# Patient Record
Sex: MALE | Race: WHITE | NOT HISPANIC OR LATINO | Employment: UNEMPLOYED | URBAN - METROPOLITAN AREA
[De-identification: names, ages, dates, MRNs, and addresses within clinical notes are randomized per-mention and may not be internally consistent; named-entity substitution may affect disease eponyms.]

---

## 2023-11-10 ENCOUNTER — HOSPITAL ENCOUNTER (EMERGENCY)
Facility: HOSPITAL | Age: 58
Discharge: HOME | End: 2023-11-10
Attending: EMERGENCY MEDICINE
Payer: COMMERCIAL

## 2023-11-10 VITALS
TEMPERATURE: 97 F | DIASTOLIC BLOOD PRESSURE: 66 MMHG | OXYGEN SATURATION: 97 % | BODY MASS INDEX: 29.84 KG/M2 | SYSTOLIC BLOOD PRESSURE: 125 MMHG | RESPIRATION RATE: 18 BRPM | HEART RATE: 91 BPM | HEIGHT: 75 IN | WEIGHT: 240 LBS

## 2023-11-10 DIAGNOSIS — Z86.79 HISTORY OF ATRIAL FIBRILLATION: ICD-10-CM

## 2023-11-10 DIAGNOSIS — S40.019A CONTUSION OF SHOULDER, UNSPECIFIED LATERALITY, INITIAL ENCOUNTER: ICD-10-CM
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
## 2023-11-10 DIAGNOSIS — S06.0X1A CONCUSSION WITH LOSS OF CONSCIOUSNESS OF 30 MINUTES OR LESS, INITIAL ENCOUNTER: ICD-10-CM

## 2023-11-10 DIAGNOSIS — Z79.01 ANTICOAGULATED BY ANTICOAGULATION TREATMENT: Primary | ICD-10-CM

## 2023-11-10 PROCEDURE — 12002 RPR S/N/AX/GEN/TRNK2.6-7.5CM: CPT

## 2023-11-10 PROCEDURE — 25000003 PHARM REV CODE 250: Performed by: EMERGENCY MEDICINE

## 2023-11-10 PROCEDURE — 90471 IMMUNIZATION ADMIN: CPT | Performed by: EMERGENCY MEDICINE

## 2023-11-10 PROCEDURE — 63600175 PHARM REV CODE 636 W HCPCS: Performed by: EMERGENCY MEDICINE

## 2023-11-10 PROCEDURE — 99285 EMERGENCY DEPT VISIT HI MDM: CPT | Mod: 25

## 2023-11-10 PROCEDURE — 90715 TDAP VACCINE 7 YRS/> IM: CPT | Performed by: EMERGENCY MEDICINE

## 2023-11-10 RX ORDER — HYDROCODONE BITARTRATE AND ACETAMINOPHEN 5; 325 MG/1; MG/1
1 TABLET ORAL EVERY 4 HOURS PRN
Qty: 15 TABLET | Refills: 0 | Status: SHIPPED | OUTPATIENT
Start: 2023-11-10

## 2023-11-10 RX ORDER — ONDANSETRON 4 MG/1
4 TABLET, ORALLY DISINTEGRATING ORAL EVERY 6 HOURS PRN
Qty: 12 TABLET | Refills: 0 | Status: SHIPPED | OUTPATIENT
Start: 2023-11-10

## 2023-11-10 RX ORDER — HYDROCODONE BITARTRATE AND ACETAMINOPHEN 5; 325 MG/1; MG/1
1 TABLET ORAL
Status: COMPLETED | OUTPATIENT
Start: 2023-11-10 | End: 2023-11-10

## 2023-11-10 RX ORDER — LIDOCAINE HYDROCHLORIDE AND EPINEPHRINE 10; 10 MG/ML; UG/ML
20 INJECTION, SOLUTION INFILTRATION; PERINEURAL ONCE
Status: COMPLETED | OUTPATIENT
Start: 2023-11-10 | End: 2023-11-10

## 2023-11-10 RX ORDER — ACETAMINOPHEN 325 MG/1
650 TABLET ORAL
Status: COMPLETED | OUTPATIENT
Start: 2023-11-10 | End: 2023-11-10

## 2023-11-10 RX ADMIN — ACETAMINOPHEN 650 MG: 325 TABLET, FILM COATED ORAL at 10:11

## 2023-11-10 RX ADMIN — LIDOCAINE HYDROCHLORIDE,EPINEPHRINE BITARTRATE 20 ML: 10; .01 INJECTION, SOLUTION INFILTRATION; PERINEURAL at 10:11

## 2023-11-10 RX ADMIN — TETANUS TOXOID, REDUCED DIPHTHERIA TOXOID AND ACELLULAR PERTUSSIS VACCINE, ADSORBED 0.5 ML: 5; 2.5; 8; 8; 2.5 SUSPENSION INTRAMUSCULAR at 09:11

## 2023-11-10 RX ADMIN — HYDROCODONE BITARTRATE AND ACETAMINOPHEN 1 TABLET: 5; 325 TABLET ORAL at 12:11

## 2023-11-10 NOTE — DISCHARGE INSTRUCTIONS
Staples need to be removed in a proximally 10 days    Please follow the concussion postconcussion and head injury discharge instructions    Return for any emergency problem    Do not under estimate house sore you will be over the next 24-72 hours    No aspirin no Naprosyn no ibuprofen if you take the Norco it has 300 mg of Tylenol in it you may have 1 extra Tylenol along with the Norco if needed  Do not take your dose of Xarelto tonight for you may resume it Saturday night    Ice pack on that occipital area will help with swelling and discomfort  Same for that knot on your left shoulder blade

## 2023-11-10 NOTE — ED PROVIDER NOTES
Encounter Date: 11/10/2023       History     Chief Complaint   Patient presents with    Fall    Head Injury     C/o falling 4 feet off ladder, hitting back of head and had a 3-4 minutes LOC. States he is on a blood thinner. Laceration to back of head, having bilateral shoulder pain     58-year-old male who takes Xarelto for his atrial fibrillation.  Was on a ladder a proximally 3-1/2-4 feet off the ground.  When he fell backwards.  Striking his head the family member that is with him says that he was unconscious for 3-5 minutes.  Family member with him said he is back to normal now he is had no nausea no vomiting has a mild headache he denies neck pain.  He has bilateral shoulder pain he is had surgery on both shoulders in the past.  Denies dysfunction of the shoulders.  He does say his fingers slightly tingly on both sides.  He denies any discomfort when he flexes and extends neck he  denies any discomfort when he rotates head side to side.  He arrived by private vehicle movement of neck does not affect the tingling does not cause any pain  Denies injury anywhere else.  Family member says he is back to normal now.  He has a laceration that bled in his occiput area midline scalp.  Unsure about his last tetanus.  He is had COVID vaccines no pneumonia no flu shots.  He does not use tobacco alcohol or drugs  He is employed  man lives home with his wife.  Dr. Martinez is his primary healthcare provider.  Dr. Miah Macias is his cardiologists  He denies having any drug allergies.  Says his mother is  from cancer.  Said his father has had heart surgery.  But is alive  He takes medications for atrial fibrillation hypertension diabetes and high cholesterol  He is had bilateral shoulder surgery and ORIF of 1 forearm.      Review of patient's allergies indicates:  No Known Allergies  History reviewed. No pertinent past medical history.  History reviewed. No pertinent surgical history.  History reviewed. No pertinent  family history.  Social History     Tobacco Use    Smoking status: Never    Smokeless tobacco: Never     Review of Systems   Constitutional: Negative.    HENT: Negative.     Eyes: Negative.    Respiratory: Negative.     Cardiovascular: Negative.    Gastrointestinal: Negative.  Negative for nausea and vomiting.   Endocrine: Negative.    Genitourinary: Negative.    Musculoskeletal:  Positive for arthralgias (Bilateral shoulder pain mild). Negative for back pain and neck pain.   Skin:  Positive for wound (Laceration occipital scalp).   Allergic/Immunologic: Negative.    Neurological:  Positive for numbness (Not so much numbness but slight tingling to fingers both hands) and headaches. Negative for dizziness and speech difficulty.   Hematological: Negative.    Psychiatric/Behavioral: Negative.     All other systems reviewed and are negative.      Physical Exam     Initial Vitals [11/10/23 0905]   BP Pulse Resp Temp SpO2   (!) 154/79 86 17 96.9 °F (36.1 °C) 95 %      MAP       --         Physical Exam    Nursing note and vitals reviewed.  Constitutional: He appears well-developed and well-nourished. No distress.   Mildly obese white male fully awake oriented GCS is 15 does not appear to be in any gross distress.  Awake oriented able to stand up and transfer from wheelchair to bed with no issue.  GCS is 15 wide awake talking clear headed current denies any severe headache denies nausea vomiting   HENT:   Head: Normocephalic.   Right Ear: Tympanic membrane and external ear normal.   Left Ear: Tympanic membrane and external ear normal.   Nose: Nose normal.   Mouth/Throat: Oropharynx is clear and moist and mucous membranes are normal. Oral lesions: moist muc memb.   There is a crescent-shaped laceration in the mid occiput area.  Without any gross active bleeding at this time.  It is not gaped open.  No evidence of hemotympanum on exam  Patient denies any malocclusion chipped or cracked teeth.   Eyes: Conjunctivae and EOM are  normal. Pupils are equal, round, and reactive to light.   Eyes track normally he denies diplopia visual changes   Neck: Neck supple. No thyromegaly present. No tracheal deviation present. No JVD present.   Able to fully flex and extend neck with no point tenderness   Rotation side to side does not cause any pain does not change the minimum tingling he complains of intermittently in both hands   Normal range of motion.  Cardiovascular:  Normal rate, regular rhythm, normal heart sounds and intact distal pulses.     Exam reveals no gallop and no friction rub.       No murmur heard.  Pulmonary/Chest: Breath sounds normal. No stridor. No respiratory distress. He has no wheezes. He has no rhonchi. He has no rales. He exhibits no tenderness (No chest wall tenderness when you compress chest anterior posterior lateral to lateral.  Does not have any pleuritic pain or splinting when he takes a deep breath  when he takes a big breath).   Abdominal: Abdomen is soft. Bowel sounds are normal. He exhibits no distension and no mass. No signs of injury. There is no abdominal tenderness.   Slightly obese abdomen good bowel sounds all quadrants no masses no rebound   Genitourinary:    Genitourinary Comments: Nontender thoracic lumbar sacral spine no CVA tenderness     Musculoskeletal:         General: Tenderness present. No edema. Normal range of motion.      Cervical back: Normal range of motion and neck supple.      Comments: He is tender when you touch both shoulders is no contour abnormality.  The clavicles are not tender.  hurts when you touch both shoulders.  Axillary nerves intact bilaterally elbows hands benign bilaterally     Lymphadenopathy:     He has no cervical adenopathy.   Neurological: He is alert and oriented to person, place, and time. He has normal strength and normal reflexes. No cranial nerve deficit or sensory deficit. GCS score is 15. GCS eye subscore is 4. GCS verbal subscore is 5. GCS motor subscore is 6.    Bilateral radial bilateral patellar reflexes 2+ brisk  strength 5/5 bilaterally leg strength 5/5 bilaterally normal sensation upper and lower extremities.  Neurological seems completely appropriate at this time.  GCS is 15   Skin: Skin is warm and dry. Capillary refill takes less than 2 seconds. No rash noted.   Psychiatric: He has a normal mood and affect. His behavior is normal. Judgment and thought content normal.         ED Course   Lac Repair    Date/Time: 11/10/2023 10:53 AM    Performed by: Bandar Bundy MD  Authorized by: Bandar Bundy MD    Consent:     Consent obtained:  Verbal    Consent given by:  Patient    Risks, benefits, and alternatives were discussed: yes      Risks discussed:  Infection  Universal protocol:     Procedure explained and questions answered to patient or proxy's satisfaction: yes      Patient identity confirmed:  Verbally with patient  Anesthesia:     Anesthesia method:  Local infiltration    Local anesthetic:  Lidocaine 1% WITH epi  Laceration details:     Location:  Scalp    Scalp location:  Occipital    Length (cm):  2.9    Depth (mm):  5  Pre-procedure details:     Preparation:  Patient was prepped and draped in usual sterile fashion and imaging obtained to evaluate for foreign bodies  Exploration:     Limited defect created (wound extended): no      Hemostasis achieved with:  Epinephrine    Imaging outcome: foreign body not noted      Wound extent: no areolar tissue violation noted, no fascia violation noted, no foreign bodies/material noted, no muscle damage noted, no nerve damage noted, no tendon damage noted, no underlying fracture noted and no vascular damage noted      Contaminated: no    Treatment:     Area cleansed with:  Povidone-iodine    Amount of cleaning:  Standard    Irrigation solution:  Sterile saline    Irrigation volume:  20    Irrigation method:  Syringe    Visualized foreign bodies/material removed: no      Debridement:  None    Undermining:   None    Scar revision: no    Skin repair:     Repair method:  Staples    Number of staples:  6  Approximation:     Approximation:  Close  Repair type:     Repair type:  Simple  Post-procedure details:     Dressing:  Antibiotic ointment    Procedure completion:  Tolerated well, no immediate complications  Comments:      After injection of local anesthetic.  The 2 small laceration on the right side were stapled 1 staple a piece the longer 1 on the left side was closed with 4 staples total of 6 staples used total length of laceration 2.9 cm    Labs Reviewed - No data to display       Imaging Results              X-Ray Shoulder Trauma Right (Final result)  Result time 11/10/23 10:13:08      Final result by Vidal Ramsay MD (11/10/23 10:13:08)                   Impression:      No acute osseous process identified.      Electronically signed by: Vidal Ramsay  Date:    11/10/2023  Time:    10:13               Narrative:    EXAMINATION:  XR SHOULDER TRAUMA 3 VIEW RIGHT    CLINICAL HISTORY:  Unspecified fall, initial encounter    TECHNIQUE:  Two or three views of the right shoulder.    COMPARISON:  None    FINDINGS:  No acute fracture identified.  Glenohumeral and AC joints are aligned with mild to moderate underlying degenerative changes.                                       X-Ray Shoulder Trauma Left (Final result)  Result time 11/10/23 10:11:32      Final result by Vidal Ramsay MD (11/10/23 10:11:32)                   Impression:      No acute osseous process identified.      Electronically signed by: Vidal Ramsay  Date:    11/10/2023  Time:    10:11               Narrative:    EXAMINATION:  XR SHOULDER TRAUMA 3 VIEW LEFT    CLINICAL HISTORY:  Unspecified fall, initial encounter    TECHNIQUE:  Two or three views of the left shoulder.    COMPARISON:  None    FINDINGS:  No acute fracture identified.  Glenohumeral and AC joints are aligned with mild to moderate underlying degenerative changes.                                        CT Head Without Contrast (Final result)  Result time 11/10/23 10:02:35      Final result by Vidal Ramsay MD (11/10/23 10:02:35)                   Impression:      No acute intracranial process identified.      Electronically signed by: Vidal Ramsay  Date:    11/10/2023  Time:    10:02               Narrative:    EXAMINATION:  CT HEAD WITHOUT CONTRAST    CLINICAL HISTORY:  Trauma;    TECHNIQUE:  CT images of the head without IV contrast. Axial, coronal and sagittal images reviewed. Dose length product 1310 mGycm. Automatic exposure control, adjustment of mA/kV or iterative reconstruction technique used to limit radiation dose.    COMPARISON:  CT 06/01/2021    FINDINGS:  Extra-axial spaces/ventricular system: Normal for age.    Intracranial hemorrhage: None identified.    Cerebral parenchyma: No acute large vessel territory infarct or mass effect identified.    Vascular system: No hyperdense vessel appreciated.    Cerebellum: Normal.    Sella: Normal.    Included paranasal sinuses and mastoid air cells: Well-aerated.    Visualized orbits: Normal.    Scalp/Calvarium: Small scalp contusion and trace subcutaneous gas posteriorly.  No depressed skull fracture.                                       CT Cervical Spine Without Contrast (Final result)  Result time 11/10/23 10:10:39      Final result by Vidal Ramsay MD (11/10/23 10:10:39)                   Impression:      No acute cervical spine fracture or traumatic malalignment identified.      Electronically signed by: Vidal Ramsay  Date:    11/10/2023  Time:    10:10               Narrative:    EXAMINATION:  CT CERVICAL SPINE WITHOUT CONTRAST    CLINICAL HISTORY:  Trauma fall head laceration on Eliquis;    TECHNIQUE:  Noncontrast CT images of the cervical spine. Axial, coronal, and sagittal reformatted images reviewed. Dose length product is 1310 mGycm. Automatic exposure control, adjustment of mA/kV or iterative reconstruction technique  used to limit radiation dose.    COMPARISON:  No relevant comparison studies available at the time of dictation.    FINDINGS:  Fractures: No acute fracture identified.    Alignment: Straightening of the cervical lordosis.  No subluxation.    Prevertebral soft tissues: Within normal limits.    Degenerative changes: Multilevel osteophytosis.  Mild disc space narrowing at C3-4 and C6-7.    Incidental findings: None identified.                                       Medications   HYDROcodone-acetaminophen 5-325 mg per tablet 1 tablet (has no administration in time range)   LIDOcaine-EPINEPHrine 1%-1:100,000 injection 20 mL (20 mLs Intradermal Given 11/10/23 1056)   Tdap (BOOSTRIX) vaccine injection 0.5 mL (0.5 mLs Intramuscular Given 11/10/23 0963)   acetaminophen tablet 650 mg (650 mg Oral Given 11/10/23 1026)     Medical Decision Making  About 30 minutes prior to arrival had the fall    58-year-old male who takes Xarelto for his atrial fibrillation.  Was on a ladder a proximally 3-1/2-4 feet off the ground.  When he fell backwards.  Striking his head the family member that is with him says that he was unconscious for 3-5 minutes.  Family member with him said he is back to normal now he is had no nausea no vomiting has a mild headache he denies neck pain.  He has bilateral shoulder pain he is had surgery on both shoulders in the past.  Denies dysfunction of the shoulders.  He does say his fingers slightly tingly on both sides.  He denies any discomfort when he flexes and extends neck he  denies any discomfort when he rotates head side to side.  He arrived by private vehicle movement of neck does not affect the tingling does not cause any pain  Denies injury anywhere else.  Family member says he is back to normal now.  He has a laceration that bled in his occiput area midline scalp.  Unsure about his last tetanus.  He is had COVID vaccines no pneumonia no flu shots.      Amount and/or Complexity of Data  Reviewed  Independent Historian: spouse     Details: Family member who came in with him as well as spouse provide background information family member who is with him at the time said unconscious 3-5 minutes acting normal now  Radiology: ordered and independent interpretation performed.     Details: Bilateral shoulders head CT neck CT ordered and pending  Discussion of management or test interpretation with external provider(s): Ddx   Scalp laceration, concussion grade 1, anticoagulated by anticoagulation treatment, intracranial hemorrhage, in his cerebral contusion, subarachnoid hemorrhage subdural hemorrhage cervical spine injury, intraparenchymal hemorrhage shoulder injury    Risk  Prescription drug management.  Risk Details: MDM  Problems addressed  Co-morbidities and/or factors adding to the complexity or risk for the patient:  Anticoagulated on Xarelto  Problems addressed:  Fall 3-4 feet landed hit head laceration knocked out anticoagulated  Acute problem/illness or progression/exacerbation of chronic problem with potential threat to life/bodily dysfunction?:  Intracranial bleeding and a man on Xarelto life-threatening  Differential diagnoses/problems considered: see above     Amount and/or Complexity of Data Reviewed  Independent Historian: friend  (see above for summary)  External Data Reviewed: notes from previous ED visits (see above for summary) Bell's palsy workup in 2021  Risk and benefits of testing: discussed   Labs: Labs: ordered and reviewed  Radiology:Radiology: ordered and independent interpretation performed (see above or ED course)  ECG/medicine tests:Radiology: ordered and independent interpretation performed (see above or ED course)  discussed with primary care physician    Risk  Prescription drug management   Diagnosis or treatment significantly impacted by social determinants of health: none   Shared decision making     Critical Care  none     Critical Care  Total time providing critical  care: 0 minutes               ED Course as of 11/10/23 1200   Fri Nov 10, 2023   1022 Radiographs have returned negative brain CT returned negative cervical spine CT negative [DM]   1023 Radiographs of both shoulders are negative.  I explained all this to the family.  I told the family that I do not want him to go home to a least 12/12/2030.  That we would come so up his head I cautioned him about how sore he would be I talked to him about narcotics had the narcotics discussion if the time of discharge at noon he is still awake and lucid I will give him [place] to go home with.  He is getting 650 of acetaminophen now for shoulder pain his regular attending physician hurt he was in the hospital came and visited with him.  He did not find any deficits either [DM]   1118 When we sat the patient up to prepare his occipital laceration.  Large bruise and superficial abrasion was noted overlying the left scapula.  He was tender in this area. [DM]   1158 At noon I visited with the patient wife and other family member in the room.  Neurologically he is GCS 15 is no focal deficits is complaining of right shoulder pain now more than left I told him again how sore he is going to be over the next 72 hours give him 1 [place] here talked to him about taking Tylenol with the Norco 1 tablet okay talk to him about common sense problems head discharge instructions etc.. [DM]      ED Course User Index  [DM] Bandar Bundy MD                    Clinical Impression:   Final diagnoses:  [W19.XXXA] Fall  [Z79.01] Anticoagulated by anticoagulation treatment (Primary)  [S40.019A] Contusion of shoulder, unspecified laterality, initial encounter - Bilateral shoulder contusion  [Z86.79] History of atrial fibrillation  [S06.0X1A] Concussion with loss of consciousness of 30 minutes or less, initial encounter - 3-5 minutes per family who was present at the event        ED Disposition Condition    Discharge Stable          ED Prescriptions        Medication Sig Dispense Start Date End Date Auth. Provider    HYDROcodone-acetaminophen (NORCO) 5-325 mg per tablet Take 1 tablet by mouth every 4 (four) hours as needed for Pain. 15 tablet 11/10/2023 -- Bandar Bundy MD    ondansetron (ZOFRAN-ODT) 4 MG TbDL Take 1 tablet (4 mg total) by mouth every 6 (six) hours as needed. 12 tablet 11/10/2023 -- Bandar Bundy MD          Follow-up Information       Follow up With Specialties Details Why Contact Info    Manohar Martinez MD Internal Medicine In 5 days As needed [address] [facility] [address]  [facility], [facility] [address]  [phone]               Bandar Bundy MD  11/10/23 3852

## 2023-11-10 NOTE — Clinical Note
"James"Maryjane Christianson was seen and treated in our emergency department on 11/10/2023.  He may return to work on 11/13/2023.       If you have any questions or concerns, please don't hesitate to call.      Bandar Bundy MD"

## 2023-11-22 DIAGNOSIS — M25.511 RIGHT SHOULDER PAIN: ICD-10-CM

## 2023-11-22 DIAGNOSIS — G89.29 CHRONIC RIGHT SHOULDER PAIN: Primary | ICD-10-CM

## 2023-11-22 DIAGNOSIS — M25.511 CHRONIC RIGHT SHOULDER PAIN: Primary | ICD-10-CM
